# Patient Record
Sex: FEMALE | Race: WHITE | NOT HISPANIC OR LATINO | Employment: FULL TIME | URBAN - METROPOLITAN AREA
[De-identification: names, ages, dates, MRNs, and addresses within clinical notes are randomized per-mention and may not be internally consistent; named-entity substitution may affect disease eponyms.]

---

## 2018-03-11 ENCOUNTER — HOSPITAL ENCOUNTER (EMERGENCY)
Facility: MEDICAL CENTER | Age: 53
End: 2018-03-11
Attending: EMERGENCY MEDICINE
Payer: COMMERCIAL

## 2018-03-11 ENCOUNTER — APPOINTMENT (OUTPATIENT)
Dept: RADIOLOGY | Facility: MEDICAL CENTER | Age: 53
End: 2018-03-11
Attending: EMERGENCY MEDICINE
Payer: COMMERCIAL

## 2018-03-11 VITALS
WEIGHT: 260.14 LBS | BODY MASS INDEX: 36.42 KG/M2 | TEMPERATURE: 98.7 F | OXYGEN SATURATION: 95 % | SYSTOLIC BLOOD PRESSURE: 144 MMHG | HEART RATE: 85 BPM | HEIGHT: 71 IN | RESPIRATION RATE: 18 BRPM | DIASTOLIC BLOOD PRESSURE: 81 MMHG

## 2018-03-11 DIAGNOSIS — J10.1 INFLUENZA B: ICD-10-CM

## 2018-03-11 LAB
ALBUMIN SERPL BCP-MCNC: 4.5 G/DL (ref 3.2–4.9)
ALBUMIN/GLOB SERPL: 1.6 G/DL
ALP SERPL-CCNC: 43 U/L (ref 30–99)
ALT SERPL-CCNC: 20 U/L (ref 2–50)
ANION GAP SERPL CALC-SCNC: 11 MMOL/L (ref 0–11.9)
APPEARANCE UR: CLEAR
AST SERPL-CCNC: 18 U/L (ref 12–45)
BACTERIA #/AREA URNS HPF: ABNORMAL /HPF
BASOPHILS # BLD AUTO: 0.8 % (ref 0–1.8)
BASOPHILS # BLD: 0.03 K/UL (ref 0–0.12)
BILIRUB SERPL-MCNC: 0.4 MG/DL (ref 0.1–1.5)
BILIRUB UR QL STRIP.AUTO: NEGATIVE
BUN SERPL-MCNC: 10 MG/DL (ref 8–22)
CALCIUM SERPL-MCNC: 9.2 MG/DL (ref 8.5–10.5)
CHLORIDE SERPL-SCNC: 101 MMOL/L (ref 96–112)
CO2 SERPL-SCNC: 20 MMOL/L (ref 20–33)
COLOR UR: YELLOW
CREAT SERPL-MCNC: 0.82 MG/DL (ref 0.5–1.4)
EOSINOPHIL # BLD AUTO: 0.01 K/UL (ref 0–0.51)
EOSINOPHIL NFR BLD: 0.3 % (ref 0–6.9)
EPI CELLS #/AREA URNS HPF: ABNORMAL /HPF
ERYTHROCYTE [DISTWIDTH] IN BLOOD BY AUTOMATED COUNT: 42.7 FL (ref 35.9–50)
FLUAV RNA SPEC QL NAA+PROBE: NEGATIVE
FLUBV RNA SPEC QL NAA+PROBE: POSITIVE
GLOBULIN SER CALC-MCNC: 2.9 G/DL (ref 1.9–3.5)
GLUCOSE SERPL-MCNC: 103 MG/DL (ref 65–99)
GLUCOSE UR STRIP.AUTO-MCNC: NEGATIVE MG/DL
HCT VFR BLD AUTO: 45.6 % (ref 37–47)
HGB BLD-MCNC: 14.9 G/DL (ref 12–16)
HYALINE CASTS #/AREA URNS LPF: ABNORMAL /LPF
IMM GRANULOCYTES # BLD AUTO: 0.02 K/UL (ref 0–0.11)
IMM GRANULOCYTES NFR BLD AUTO: 0.5 % (ref 0–0.9)
KETONES UR STRIP.AUTO-MCNC: ABNORMAL MG/DL
LACTATE BLD-SCNC: 1.3 MMOL/L (ref 0.5–2)
LEUKOCYTE ESTERASE UR QL STRIP.AUTO: ABNORMAL
LYMPHOCYTES # BLD AUTO: 0.72 K/UL (ref 1–4.8)
LYMPHOCYTES NFR BLD: 18.8 % (ref 22–41)
MCH RBC QN AUTO: 28.9 PG (ref 27–33)
MCHC RBC AUTO-ENTMCNC: 32.7 G/DL (ref 33.6–35)
MCV RBC AUTO: 88.5 FL (ref 81.4–97.8)
MICRO URNS: ABNORMAL
MONOCYTES # BLD AUTO: 0.61 K/UL (ref 0–0.85)
MONOCYTES NFR BLD AUTO: 15.9 % (ref 0–13.4)
NEUTROPHILS # BLD AUTO: 2.44 K/UL (ref 2–7.15)
NEUTROPHILS NFR BLD: 63.7 % (ref 44–72)
NITRITE UR QL STRIP.AUTO: NEGATIVE
NRBC # BLD AUTO: 0 K/UL
NRBC BLD-RTO: 0 /100 WBC
PH UR STRIP.AUTO: 6 [PH]
PLATELET # BLD AUTO: 274 K/UL (ref 164–446)
PMV BLD AUTO: 9.8 FL (ref 9–12.9)
POTASSIUM SERPL-SCNC: 3.8 MMOL/L (ref 3.6–5.5)
PROT SERPL-MCNC: 7.4 G/DL (ref 6–8.2)
PROT UR QL STRIP: NEGATIVE MG/DL
RBC # BLD AUTO: 5.15 M/UL (ref 4.2–5.4)
RBC # URNS HPF: ABNORMAL /HPF
RBC UR QL AUTO: NEGATIVE
SODIUM SERPL-SCNC: 132 MMOL/L (ref 135–145)
SP GR UR STRIP.AUTO: 1.01
UROBILINOGEN UR STRIP.AUTO-MCNC: 0.2 MG/DL
WBC # BLD AUTO: 3.8 K/UL (ref 4.8–10.8)
WBC #/AREA URNS HPF: ABNORMAL /HPF

## 2018-03-11 PROCEDURE — 87502 INFLUENZA DNA AMP PROBE: CPT

## 2018-03-11 PROCEDURE — 83605 ASSAY OF LACTIC ACID: CPT

## 2018-03-11 PROCEDURE — 80053 COMPREHEN METABOLIC PANEL: CPT

## 2018-03-11 PROCEDURE — 96360 HYDRATION IV INFUSION INIT: CPT

## 2018-03-11 PROCEDURE — A9270 NON-COVERED ITEM OR SERVICE: HCPCS | Performed by: EMERGENCY MEDICINE

## 2018-03-11 PROCEDURE — 87077 CULTURE AEROBIC IDENTIFY: CPT

## 2018-03-11 PROCEDURE — 700102 HCHG RX REV CODE 250 W/ 637 OVERRIDE(OP): Performed by: EMERGENCY MEDICINE

## 2018-03-11 PROCEDURE — 85025 COMPLETE CBC W/AUTO DIFF WBC: CPT

## 2018-03-11 PROCEDURE — 71045 X-RAY EXAM CHEST 1 VIEW: CPT

## 2018-03-11 PROCEDURE — 700105 HCHG RX REV CODE 258: Performed by: EMERGENCY MEDICINE

## 2018-03-11 PROCEDURE — 36415 COLL VENOUS BLD VENIPUNCTURE: CPT

## 2018-03-11 PROCEDURE — 87086 URINE CULTURE/COLONY COUNT: CPT

## 2018-03-11 PROCEDURE — 99284 EMERGENCY DEPT VISIT MOD MDM: CPT

## 2018-03-11 PROCEDURE — 81001 URINALYSIS AUTO W/SCOPE: CPT

## 2018-03-11 PROCEDURE — 87040 BLOOD CULTURE FOR BACTERIA: CPT

## 2018-03-11 PROCEDURE — 87186 SC STD MICRODIL/AGAR DIL: CPT

## 2018-03-11 RX ORDER — OSELTAMIVIR PHOSPHATE 75 MG/1
75 CAPSULE ORAL 2 TIMES DAILY
Qty: 10 CAP | Refills: 0 | Status: SHIPPED | OUTPATIENT
Start: 2018-03-11 | End: 2019-10-13

## 2018-03-11 RX ORDER — OSELTAMIVIR PHOSPHATE 75 MG/1
75 CAPSULE ORAL ONCE
Status: COMPLETED | OUTPATIENT
Start: 2018-03-11 | End: 2018-03-11

## 2018-03-11 RX ORDER — SODIUM CHLORIDE 9 MG/ML
1000 INJECTION, SOLUTION INTRAVENOUS ONCE
Status: COMPLETED | OUTPATIENT
Start: 2018-03-11 | End: 2018-03-11

## 2018-03-11 RX ADMIN — SODIUM CHLORIDE 1000 ML: 9 INJECTION, SOLUTION INTRAVENOUS at 17:42

## 2018-03-11 RX ADMIN — OSELTAMIVIR PHOSPHATE 75 MG: 75 CAPSULE ORAL at 18:40

## 2018-03-11 ASSESSMENT — LIFESTYLE VARIABLES: DO YOU DRINK ALCOHOL: NO

## 2018-03-11 ASSESSMENT — PAIN SCALES - GENERAL: PAINLEVEL_OUTOF10: 6

## 2018-03-11 NOTE — ED TRIAGE NOTES
".  Chief Complaint   Patient presents with   • Flu Like Symptoms     x 2 days, chills, body aches     ./81   Pulse (!) 115   Temp 37.1 °C (98.7 °F) (Temporal)   Resp 17   Ht 1.803 m (5' 11\")   Wt 118 kg (260 lb 2.3 oz)   SpO2 95%   BMI 36.28 kg/m²     Ambulatory to triage with above complaints, educated on triage process, placed in lobby with mask in place, told to inform staff of any changes in condition, sepsis protocol ordered.    "

## 2018-03-12 NOTE — ED PROVIDER NOTES
"ED Provider Note    CHIEF COMPLAINT  Chief Complaint   Patient presents with   • Flu Like Symptoms     x 2 days, chills, body aches       HPI  Meli Navarrete is a 52 y.o. female who presents to the emergency department complaining that she has been ill since Friday with fever chills cough and body aches and generalized discomfort. She did not have a flu shot this year. She does not recognize any exacerbating or alleviating factors or precipitating events    REVIEW OF SYSTEMS no chest pain no hemoptysis no shortness of breath no black or bloody stool or emesis. All other systems negative    PAST MEDICAL HISTORY  Past Medical History:   Diagnosis Date   • Acute chest pain 3/29/2016   • Anxiety 3/8/2010   • Depression 3/8/2010       FAMILY HISTORY  Family History   Problem Relation Age of Onset   • Psychiatry Father      depression   • Heart Disease Mother      atrial fibrillation    • Heart Disease Maternal Grandmother      atrial fibrillation        SOCIAL HISTORY  Social History     Social History   • Marital status: Single     Spouse name: N/A   • Number of children: N/A   • Years of education: N/A     Social History Main Topics   • Smoking status: Never Smoker   • Smokeless tobacco: Never Used   • Alcohol use No   • Drug use: No   • Sexual activity: Yes     Partners: Male     Other Topics Concern   • Not on file     Social History Narrative   • No narrative on file       SURGICAL HISTORY  Past Surgical History:   Procedure Laterality Date   • GYN SURGERY  2001    oopherectomy       CURRENT MEDICATIONS  Home Medications     Reviewed by Chad La R.N. (Registered Nurse) on 03/11/18 at 1717  Med List Status: Complete   Medication Last Dose Status   alprazolam (XANAX) 0.25 MG Tab 12/11/2017 Active                ALLERGIES  No Known Allergies    PHYSICAL EXAM  VITAL SIGNS: /81   Pulse 85   Temp 37.1 °C (98.7 °F) (Temporal)   Resp 18   Ht 1.803 m (5' 11\")   Wt 118 kg (260 lb 2.3 oz)   LMP 02/18/2018   " SpO2 95%   BMI 36.28 kg/m²    Oxygen saturation is interpreted as adequate  Constitutional: Awake and mildly ill-appearing  HENT: Mucous membranes are dry  Eyes: No erythema or discharge or jaundice  Neck: Trachea midline no JVD  Cardiovascular: Regular tachycardia at the time of arrival  Lungs: Clear and equal bilaterally  Abdomen/Back: Obese soft nontender no peritoneal findings  Skin: Warm and dry  Musculoskeletal: No acute bony deformity  Neurologic: Awake verbal moving all extremities    Laboratory  CBC shows white blood cell count of 3.8 hemoglobin is adequate at 14.9 and basic metabolic panels unremarkable of T's unremarkable lactic acid level is normal at 1.3 influenza B test is positive    Radiology  DX-CHEST-PORTABLE (1 VIEW)   Final Result      Negative single view of the chest.            MEDICAL DECISION MAKING and DISPOSITION  In the emergency department an IV was established the patient was given intravenous fluids for clinical findings of dehydration as described above with tachycardia. She was also given oral Tamiflu. These measures have been helpful the heart rate has normalized. I reviewed all the findings with the patient and I feel be safe to continue treatment on an outpatient basis and have written a prescription for Tamiflu. The patient is to rest at home she can use Tylenol and Motrin as needed for discomfort if she feels she is having new or worsening symptoms she may return here for recheck.    IMPRESSION  1. Influenza B         Electronically signed by: Deric Cisneros, 3/11/2018 7:44 PM

## 2018-03-12 NOTE — ED NOTES
Lab called with critical result of flu b positive at 1825. Critical lab result read back to lab.   Dr. Cisneros notified of critical lab result at 1825.  Critical lab result read back by Dr. Cisneros.

## 2018-03-12 NOTE — ED NOTES
Patient discharged with instructions for influenza with prescriptions x0 signs and symptoms explained to patient for reasons to return to emergency department, Patient is understanding and has no further questions.

## 2018-03-12 NOTE — ED TRIAGE NOTES
"Agree with triage note. Pt. Reports fa Pt. Placed on tigue since Friday C/O \"chills and body aches.\"  "

## 2018-03-16 LAB
BACTERIA BLD CULT: NORMAL
BACTERIA BLD CULT: NORMAL
SIGNIFICANT IND 70042: NORMAL
SIGNIFICANT IND 70042: NORMAL
SITE SITE: NORMAL
SITE SITE: NORMAL
SOURCE SOURCE: NORMAL
SOURCE SOURCE: NORMAL

## 2018-03-20 NOTE — ED NOTES
"ED Positive Culture Follow-up/Notification Note:    Date: 3/20/18     Patient seen in the ED on 3/11/2018 for feeling ill with fevers, chills, cough and body aches.   1. Influenza B       Discharge Medication List as of 3/11/2018  6:55 PM      START taking these medications    Details   oseltamivir (TAMIFLU) 75 MG Cap Take 1 Cap by mouth 2 times a day., Disp-10 Cap, R-0, Print Rx Paper             Allergies: Patient has no known allergies.     Vitals:    03/11/18 1652 03/11/18 1730 03/11/18 1800 03/11/18 1833   BP:       Pulse:  91 83 85   Resp:  18 18 18   Temp:       TempSrc:       SpO2:  94% 94% 95%   Weight: 118 kg (260 lb 2.3 oz)      Height:           Final cultures:   Results     Procedure Component Value Units Date/Time    BLOOD CULTURE [[id number]] Collected:  03/11/18 1708    Order Status:  Completed Specimen:  Blood from Peripheral Updated:  03/16/18 1900     Significant Indicator NEG     Source BLD     Site PERIPHERAL     Blood Culture No growth after 5 days of incubation.    Narrative:       Per Hospital Policy: Only change Specimen Src: to \"Line\" if  specified by physician order.    BLOOD CULTURE [[id number]] Collected:  03/11/18 1230    Order Status:  Completed Specimen:  Blood from Peripheral Updated:  03/16/18 1900     Significant Indicator NEG     Source BLD     Site PERIPHERAL     Blood Culture No growth after 5 days of incubation.    Narrative:       Per Hospital Policy: Only change Specimen Src: to \"Line\" if  specified by physician order.    URINE CULTURE(NEW) [[id number]]  (Abnormal) Collected:  03/11/18 1830    Order Status:  Completed Specimen:  Urine Updated:  03/15/18 1639     Significant Indicator POS (POS)     Source UR     Site --     Urine Culture -- (A)      Lactose fermenting Gram negative isrrael  ,000 cfu/mL   (A)    Narrative:       Indication for culture:->Emergency Room Patient          Plan:   Patient does not present with symptoms consistent with urinary tract infection. Culture " result likely represents asymptomatic bacteriuria. No need for antimicrobial treatment at this time.    Kimberly Coe

## 2018-03-26 LAB — TEST NAME 95000: NORMAL

## 2018-03-27 LAB
BACTERIA UR CULT: ABNORMAL
BACTERIA UR CULT: ABNORMAL
SIGNIFICANT IND 70042: ABNORMAL
SITE SITE: ABNORMAL
SOURCE SOURCE: ABNORMAL

## 2018-03-28 NOTE — ED NOTES
ED Positive Culture Follow-up/Notification Note:    Date: 3/28/18     Patient seen in the ED on 3/11/2018 for influenza B infection.   1. Influenza B       Discharge Medication List as of 3/11/2018  6:55 PM      START taking these medications    Details   oseltamivir (TAMIFLU) 75 MG Cap Take 1 Cap by mouth 2 times a day., Disp-10 Cap, R-0, Print Rx Paper             Allergies: Patient has no known allergies.     Vitals:    03/11/18 1652 03/11/18 1730 03/11/18 1800 03/11/18 1833   BP:       Pulse:  91 83 85   Resp:  18 18 18   Temp:       TempSrc:       SpO2:  94% 94% 95%   Weight: 118 kg (260 lb 2.3 oz)      Height:           Final cultures:   Results     Procedure Component Value Units Date/Time    URINE CULTURE(NEW) [[id number]]  (Abnormal) Collected:  03/11/18 1830    Order Status:  Completed Specimen:  Urine Updated:  03/27/18 0957     Significant Indicator POS (POS)     Source UR     Site --     Urine Culture -- (A)      Citrobacter koseri  ,000 cfu/mL  Identification performed at [facility]  Organism failed to grow in susceptibility testing media.   (A)    Narrative:       Previous comment was modified by COLLINS at 12:56 on 03/16/18.  Indication for culture:->Emergency Room Patient  Aerobic Organism Identification with Reflex to Susceptibility [id number]  [facility]. Actively growing gram negatvie isrrael in pure culture. Source: urine.  Ambient.  03/16/2018  12:56          Plan:   Culture results finalized today due to the need to send the medium out to [facility] for ID  -Pt did not present with UTI symptoms. Antimicrobial therapy is not indicated.    Kendal Gama, PharmD, BCPS

## 2019-10-13 ENCOUNTER — OFFICE VISIT (OUTPATIENT)
Dept: URGENT CARE | Facility: CLINIC | Age: 54
End: 2019-10-13
Payer: COMMERCIAL

## 2019-10-13 VITALS
DIASTOLIC BLOOD PRESSURE: 84 MMHG | TEMPERATURE: 98.6 F | WEIGHT: 263 LBS | BODY MASS INDEX: 36.68 KG/M2 | OXYGEN SATURATION: 96 % | SYSTOLIC BLOOD PRESSURE: 122 MMHG | RESPIRATION RATE: 18 BRPM | HEART RATE: 89 BPM

## 2019-10-13 DIAGNOSIS — R05.9 COUGH: ICD-10-CM

## 2019-10-13 DIAGNOSIS — J06.9 VIRAL URI: ICD-10-CM

## 2019-10-13 LAB
FLUAV+FLUBV AG SPEC QL IA: NEGATIVE
INT CON NEG: NORMAL
INT CON POS: NORMAL

## 2019-10-13 PROCEDURE — 87804 INFLUENZA ASSAY W/OPTIC: CPT | Performed by: NURSE PRACTITIONER

## 2019-10-13 PROCEDURE — 99204 OFFICE O/P NEW MOD 45 MIN: CPT | Performed by: NURSE PRACTITIONER

## 2019-10-13 RX ORDER — CODEINE PHOSPHATE AND GUAIFENESIN 10; 100 MG/5ML; MG/5ML
5 SOLUTION ORAL EVERY 6 HOURS PRN
Qty: 140 ML | Refills: 0 | Status: SHIPPED | OUTPATIENT
Start: 2019-10-13 | End: 2019-10-23

## 2020-11-03 ENCOUNTER — OFFICE VISIT (OUTPATIENT)
Dept: MEDICAL GROUP | Facility: MEDICAL CENTER | Age: 55
End: 2020-11-03
Payer: COMMERCIAL

## 2020-11-03 VITALS
HEIGHT: 71 IN | OXYGEN SATURATION: 95 % | HEART RATE: 79 BPM | TEMPERATURE: 99.1 F | RESPIRATION RATE: 16 BRPM | SYSTOLIC BLOOD PRESSURE: 96 MMHG | WEIGHT: 272 LBS | BODY MASS INDEX: 38.08 KG/M2 | DIASTOLIC BLOOD PRESSURE: 68 MMHG

## 2020-11-03 DIAGNOSIS — F33.41 RECURRENT MAJOR DEPRESSIVE DISORDER, IN PARTIAL REMISSION (HCC): ICD-10-CM

## 2020-11-03 DIAGNOSIS — Z12.11 COLON CANCER SCREENING: ICD-10-CM

## 2020-11-03 DIAGNOSIS — I48.0 PAROXYSMAL A-FIB (HCC): ICD-10-CM

## 2020-11-03 DIAGNOSIS — Z23 NEED FOR VACCINATION: ICD-10-CM

## 2020-11-03 DIAGNOSIS — E66.09 CLASS 2 OBESITY DUE TO EXCESS CALORIES WITH BODY MASS INDEX (BMI) OF 37.0 TO 37.9 IN ADULT, UNSPECIFIED WHETHER SERIOUS COMORBIDITY PRESENT: ICD-10-CM

## 2020-11-03 DIAGNOSIS — Z12.31 ENCOUNTER FOR SCREENING MAMMOGRAM FOR MALIGNANT NEOPLASM OF BREAST: ICD-10-CM

## 2020-11-03 PROCEDURE — 90715 TDAP VACCINE 7 YRS/> IM: CPT | Performed by: FAMILY MEDICINE

## 2020-11-03 PROCEDURE — 90471 IMMUNIZATION ADMIN: CPT | Performed by: FAMILY MEDICINE

## 2020-11-03 PROCEDURE — 90472 IMMUNIZATION ADMIN EACH ADD: CPT | Performed by: FAMILY MEDICINE

## 2020-11-03 PROCEDURE — 99204 OFFICE O/P NEW MOD 45 MIN: CPT | Mod: 25 | Performed by: FAMILY MEDICINE

## 2020-11-03 PROCEDURE — 90750 HZV VACC RECOMBINANT IM: CPT | Performed by: FAMILY MEDICINE

## 2020-11-03 ASSESSMENT — PATIENT HEALTH QUESTIONNAIRE - PHQ9
2. FEELING DOWN, DEPRESSED, IRRITABLE, OR HOPELESS: MORE THAN HALF THE DAYS
SUM OF ALL RESPONSES TO PHQ QUESTIONS 1-9: 12
SUM OF ALL RESPONSES TO PHQ9 QUESTIONS 1 AND 2: 4
5. POOR APPETITE OR OVEREATING: SEVERAL DAYS
8. MOVING OR SPEAKING SO SLOWLY THAT OTHER PEOPLE COULD HAVE NOTICED. OR THE OPPOSITE, BEING SO FIGETY OR RESTLESS THAT YOU HAVE BEEN MOVING AROUND A LOT MORE THAN USUAL: NOT AT ALL
3. TROUBLE FALLING OR STAYING ASLEEP OR SLEEPING TOO MUCH: SEVERAL DAYS
4. FEELING TIRED OR HAVING LITTLE ENERGY: SEVERAL DAYS
7. TROUBLE CONCENTRATING ON THINGS, SUCH AS READING THE NEWSPAPER OR WATCHING TELEVISION: SEVERAL DAYS
1. LITTLE INTEREST OR PLEASURE IN DOING THINGS: MORE THAN HALF THE DAYS
6. FEELING BAD ABOUT YOURSELF - OR THAT YOU ARE A FAILURE OR HAVE LET YOURSELF OR YOUR FAMILY DOWN: NEARLY EVERY DAY
9. THOUGHTS THAT YOU WOULD BE BETTER OFF DEAD, OR OF HURTING YOURSELF: SEVERAL DAYS

## 2020-11-03 NOTE — ASSESSMENT & PLAN NOTE
The patient was admitted 3/2016 for chest pain.  Cardiac monitor revealed PAF. The patient denies recent chest pain, palpitations, orthopnea, PND, or lower ext edema. The patient's mother and  Grandmother have atrial fibrillation. Current [id number]-[id number] score 1 for female gender.  Echocardiogram completed 3/2016 unremarkable, EF 65%.

## 2020-11-03 NOTE — PROGRESS NOTES
Subjective:     CC:  Diagnoses of Class 2 obesity due to excess calories with body mass index (BMI) of 37.0 to 37.9 in adult, unspecified whether serious comorbidity present, Recurrent major depressive disorder, in partial remission (HCC), Paroxysmal A-fib (HCC), Need for vaccination, Colon cancer screening, and Encounter for screening mammogram for malignant neoplasm of breast were pertinent to this visit.    HISTORY OF THE PRESENT ILLNESS: Patient is a 55 y.o. female. She is here today to establish care. Patient works as , , has one daughter (attends [employer]). Patient is due for colonoscopy, pap, and mammogram. Shingrix and tdap administered today. Patient is amenable to colonoscopy and mammogram, would like to wait on pap at this time.    Paroxysmal a-fib  The patient was admitted 3/2016 for chest pain.  Cardiac monitor revealed PAF. The patient denies recent chest pain, palpitations, orthopnea, PND, or lower ext edema. The patient's mother and  Grandmother have atrial fibrillation. Current [id number] score 1 for female gender.  Echocardiogram completed 3/2016 unremarkable, EF 65%.    Recurrent major depressive disorder, in partial remission (HCC)  This is a chronic problem.  The patient reports a couple episodes of depression over the last 10 years.  She reports she feels that she is managing her symptoms well with various coping strategies she learned from her previous counselor. She does not feel she needs counseling or medication at this time.        Class 2 obesity due to excess calories with body mass index (BMI) of 37.0 to 37.9 in adult  The patient reports weight has been an issue for many years. She tries to maintain a healthy diet and gets physical activity doing yard work.      Allergies: Patient has no known allergies.    No current [facility]-ordered outpatient medications on file.     No current [facility]-ordered facility-administered medications on file.        Past Medical  "History:   Diagnosis Date   • Acute chest pain 3/29/2016   • Anxiety 3/8/2010   • Depression 3/8/2010       Past Surgical History:   Procedure Laterality Date   • GYN SURGERY  2001    oopherectomy       Social History     Tobacco Use   • Smoking status: Never Smoker   • Smokeless tobacco: Never Used   Substance Use Topics   • Alcohol use: No     Alcohol/week: 0.0 oz   • Drug use: No       Social History     Social History Narrative   • Not on file       Family History   Problem Relation Age of Onset   • Psychiatric Illness Father         depression   • Heart Disease Mother         atrial fibrillation    • Heart Disease Maternal Grandmother         atrial fibrillation        Health Maintenance: See above    ROS:   Gen: no fevers/chills, no changes in weight  Eyes: no changes in vision  ENT: no sore throat or nasal congestion  Pulm: no sob, no cough  CV: no chest pain  GI: no nausea/vomiting, no diarrhea or constipation  : no dysuria  MSk: no myalgias  Skin: no rash  Neuro: no headaches, no numbness/tingling  Immuno: no seasonal allergies  Heme/Lymph: no easy bruising  Psych: se above      Objective:     Exam: BP (!) 96/68 (BP Location: Left arm, Patient Position: Sitting, BP Cuff Size: Adult)   Pulse 79   Temp 37.3 °C (99.1 °F) (Temporal)   Resp 16   Ht 1.803 m (5' 11\")   Wt 123.4 kg (272 lb)   SpO2 95%  Body mass index is 37.94 kg/m².    General: Well-developed, well-nourished. Appears stated age.  Eyes: Normocephalic. Conjunctiva clear without injection or scleral icterus. Pupils equal and reactive to light.   HENT: Normocephalic, atraumatic. Ears normal shape and contour, canals are clear bilaterally, tympanic membranes pearly, oropharynx is clear without erythema, edema or exudates.   Neck: Supple; no obvious thyromegaly or nodules appreciated  Pulmonary: Clear to ausculation bilaterally.  No increased work of breathing. No rales, ronchi, or wheezing.  Cardiovascular: Regular rate and rhythm without " murmur.  Abdomen: Soft, nontender, nondistended. Normal bowel sounds. No guarding or rebound tenderness.  Neurologic: CN III-XII intact.  Lymph: No cervical or supraclavicular lymphadenopathy palpated.  Skin: Warm and dry.  No obvious lesions.  Musculoskeletal: Normal gait. No extremity cyanosis, clubbing, or edema.  Psych: Euthymic affect. Alert and oriented x 3. Judgment and insight is normal.      Assessment & Plan:   55 y.o. female with the following -    1. Class 2 obesity due to excess calories with body mass index (BMI) of 37.0 to 37.9 in adult, unspecified whether serious comorbidity present  - Discussed recommendation for diet rich in vegetables, fruits, fiber, minimal processed/packaged foods, and 150 minutes of moderate exercise per week.  - discuss weight loss medications at next visit pending labs  - Comp Metabolic Panel; Future  - HEMOGLOBIN A1C; Future  - Lipid Profile; Future  - CBC WITH DIFFERENTIAL; Future  - TSH WITH REFLEX TO FT4; Future    2. Recurrent major depressive disorder, in partial remission (HCC)  - Discussed referral for counseling and/or medication, patient declines at this time  - Discussed the importance of regular exercise, healthy diet, good sleep hygiene, and positive social interaction    3. Paroxysmal A-fib (HCC)  - Discussed cardiac monitoring, patient declines at this time as she reports she is asymptomatic  - Discussed possible anticoagulation in the future, [id number] score currently 1, no documentation of afib other than isolated occurrence in hospital 3/2016    4. Need for vaccination  - Shingles Vaccine (Shingrix)  - TDAP VACCINE =>8YO IM    5. Colon cancer screening  - REFERRAL TO GI FOR COLONOSCOPY    6. Encounter for screening mammogram for malignant neoplasm of breast  - MA-SCREENING MAMMO BILAT W/TOMOSYNTHESIS W/CAD; Future    Return in about 1 month (around 12/3/2020) for F/U lab.    Please note this dictation was created using voice recognition software. I have  made every reasonable attempt to correct obvious errors, but I expect there may be errors of grammar, and possibly content, that I did not discover before finalizing the note.

## 2020-11-03 NOTE — ASSESSMENT & PLAN NOTE
The patient reports weight has been an issue for many years. She tries to maintain a healthy diet and gets physical activity doing yard work.

## 2020-11-03 NOTE — ASSESSMENT & PLAN NOTE
This is a chronic problem.  The patient reports a couple episodes of depression over the last 10 years.  She reports she feels that she is managing her symptoms well with various coping strategies she learned from her previous counselor. She does not feel she needs counseling or medication at this time.

## 2020-11-09 ENCOUNTER — HOSPITAL ENCOUNTER (OUTPATIENT)
Dept: LAB | Facility: MEDICAL CENTER | Age: 55
End: 2020-11-09
Attending: FAMILY MEDICINE
Payer: COMMERCIAL

## 2020-11-09 DIAGNOSIS — E66.09 CLASS 2 OBESITY DUE TO EXCESS CALORIES WITH BODY MASS INDEX (BMI) OF 37.0 TO 37.9 IN ADULT, UNSPECIFIED WHETHER SERIOUS COMORBIDITY PRESENT: ICD-10-CM

## 2020-11-09 LAB
ALBUMIN SERPL BCP-MCNC: 3.8 G/DL (ref 3.2–4.9)
ALBUMIN/GLOB SERPL: 1.3 G/DL
ALP SERPL-CCNC: 46 U/L (ref 30–99)
ALT SERPL-CCNC: 16 U/L (ref 2–50)
ANION GAP SERPL CALC-SCNC: 9 MMOL/L (ref 7–16)
AST SERPL-CCNC: 16 U/L (ref 12–45)
BASOPHILS # BLD AUTO: 0.8 % (ref 0–1.8)
BASOPHILS # BLD: 0.05 K/UL (ref 0–0.12)
BILIRUB SERPL-MCNC: 0.3 MG/DL (ref 0.1–1.5)
BUN SERPL-MCNC: 15 MG/DL (ref 8–22)
CALCIUM SERPL-MCNC: 9.2 MG/DL (ref 8.5–10.5)
CHLORIDE SERPL-SCNC: 104 MMOL/L (ref 96–112)
CHOLEST SERPL-MCNC: 194 MG/DL (ref 100–199)
CO2 SERPL-SCNC: 23 MMOL/L (ref 20–33)
CREAT SERPL-MCNC: 0.67 MG/DL (ref 0.5–1.4)
EOSINOPHIL # BLD AUTO: 0.16 K/UL (ref 0–0.51)
EOSINOPHIL NFR BLD: 2.5 % (ref 0–6.9)
ERYTHROCYTE [DISTWIDTH] IN BLOOD BY AUTOMATED COUNT: 42.5 FL (ref 35.9–50)
EST. AVERAGE GLUCOSE BLD GHB EST-MCNC: 111 MG/DL
FASTING STATUS PATIENT QL REPORTED: NORMAL
GLOBULIN SER CALC-MCNC: 2.9 G/DL (ref 1.9–3.5)
GLUCOSE SERPL-MCNC: 103 MG/DL (ref 65–99)
HBA1C MFR BLD: 5.5 % (ref 0–5.6)
HCT VFR BLD AUTO: 43.9 % (ref 37–47)
HDLC SERPL-MCNC: 70 MG/DL
HGB BLD-MCNC: 14.3 G/DL (ref 12–16)
IMM GRANULOCYTES # BLD AUTO: 0.03 K/UL (ref 0–0.11)
IMM GRANULOCYTES NFR BLD AUTO: 0.5 % (ref 0–0.9)
LDLC SERPL CALC-MCNC: 103 MG/DL
LYMPHOCYTES # BLD AUTO: 1.97 K/UL (ref 1–4.8)
LYMPHOCYTES NFR BLD: 30.2 % (ref 22–41)
MCH RBC QN AUTO: 28.9 PG (ref 27–33)
MCHC RBC AUTO-ENTMCNC: 32.6 G/DL (ref 33.6–35)
MCV RBC AUTO: 88.9 FL (ref 81.4–97.8)
MONOCYTES # BLD AUTO: 0.43 K/UL (ref 0–0.85)
MONOCYTES NFR BLD AUTO: 6.6 % (ref 0–13.4)
NEUTROPHILS # BLD AUTO: 3.88 K/UL (ref 2–7.15)
NEUTROPHILS NFR BLD: 59.4 % (ref 44–72)
NRBC # BLD AUTO: 0 K/UL
NRBC BLD-RTO: 0 /100 WBC
PLATELET # BLD AUTO: 304 K/UL (ref 164–446)
PMV BLD AUTO: 10.3 FL (ref 9–12.9)
POTASSIUM SERPL-SCNC: 4.2 MMOL/L (ref 3.6–5.5)
PROT SERPL-MCNC: 6.7 G/DL (ref 6–8.2)
RBC # BLD AUTO: 4.94 M/UL (ref 4.2–5.4)
SODIUM SERPL-SCNC: 136 MMOL/L (ref 135–145)
TRIGL SERPL-MCNC: 107 MG/DL (ref 0–149)
TSH SERPL DL<=0.005 MIU/L-ACNC: 1.26 UIU/ML (ref 0.38–5.33)
WBC # BLD AUTO: 6.5 K/UL (ref 4.8–10.8)

## 2020-11-09 PROCEDURE — 84443 ASSAY THYROID STIM HORMONE: CPT

## 2020-11-09 PROCEDURE — 83036 HEMOGLOBIN GLYCOSYLATED A1C: CPT

## 2020-11-09 PROCEDURE — 36415 COLL VENOUS BLD VENIPUNCTURE: CPT

## 2020-11-09 PROCEDURE — 80053 COMPREHEN METABOLIC PANEL: CPT

## 2020-11-09 PROCEDURE — 85025 COMPLETE CBC W/AUTO DIFF WBC: CPT

## 2020-11-09 PROCEDURE — 80061 LIPID PANEL: CPT

## 2020-12-15 ENCOUNTER — APPOINTMENT (OUTPATIENT)
Dept: MEDICAL GROUP | Facility: MEDICAL CENTER | Age: 55
End: 2020-12-15
Payer: COMMERCIAL

## 2021-01-19 ENCOUNTER — OFFICE VISIT (OUTPATIENT)
Dept: MEDICAL GROUP | Facility: MEDICAL CENTER | Age: 56
End: 2021-01-19
Payer: COMMERCIAL

## 2021-01-19 VITALS
WEIGHT: 277.56 LBS | HEART RATE: 84 BPM | DIASTOLIC BLOOD PRESSURE: 72 MMHG | SYSTOLIC BLOOD PRESSURE: 124 MMHG | OXYGEN SATURATION: 95 % | BODY MASS INDEX: 38.86 KG/M2 | TEMPERATURE: 98.8 F | HEIGHT: 71 IN | RESPIRATION RATE: 16 BRPM

## 2021-01-19 DIAGNOSIS — I48.0 PAROXYSMAL A-FIB (HCC): ICD-10-CM

## 2021-01-19 DIAGNOSIS — R42 LIGHTHEADEDNESS: ICD-10-CM

## 2021-01-19 DIAGNOSIS — Z23 NEED FOR VACCINATION: ICD-10-CM

## 2021-01-19 DIAGNOSIS — R51.9 ACUTE NONINTRACTABLE HEADACHE, UNSPECIFIED HEADACHE TYPE: ICD-10-CM

## 2021-01-19 PROCEDURE — 90750 HZV VACC RECOMBINANT IM: CPT | Performed by: FAMILY MEDICINE

## 2021-01-19 PROCEDURE — 90471 IMMUNIZATION ADMIN: CPT | Performed by: FAMILY MEDICINE

## 2021-01-19 PROCEDURE — 99214 OFFICE O/P EST MOD 30 MIN: CPT | Mod: 25 | Performed by: FAMILY MEDICINE

## 2021-01-19 ASSESSMENT — FIBROSIS 4 INDEX: FIB4 SCORE: 0.71

## 2021-01-19 NOTE — ASSESSMENT & PLAN NOTE
The patient was admitted 3/2016 for chest pain.  Cardiac monitor revealed PAF. The patient denies recent chest pain, palpitations, orthopnea, PND, or lower ext edema. The patient's mother and  Grandmother have atrial fibrillation. Current [id number]-DAYl score 1 for female gender.  Echocardiogram completed 3/2016 unremarkable, EF 65%.

## 2021-01-19 NOTE — PROGRESS NOTES
"Subjective:     CC: lightheadedness     HPI:   Meli presents today with:    Lightheadedness  The patient reports she has experienced a \"very weird\" sensation where she feels lightheadedness, changes in vision, and feels like she is \"out of body.\" The episodes right spontaneously, lasted a few seconds, and have resolved spontaneously.     The first episode occurred five days ago while she was driving and stopped at a stop sign. The second episode occurred later that evening while she was working at her desk. A third episode occurred yesterday while she was sitting on the couch. She reports an abnormal taste in her mouth after the third episode. She also experienced a pressure sensation in her head.  She stood up to get a glass of water and felt her heart racing. She denies bowel or bladder incontinence, involuntary movement, focal numbness, weakness, dysarthria, confusion. She denies chest pain or shortness of breath. She denies preceding anxiety or recent history of panic attacks (she had a panic attack 18 yo).     EMTs evaluated her after the most recent episode; she reports her glucose was 114, her BP was 190/60. ECG she brought with her today shows sinus rhythm HR 89 no ST changes.    Her paternal uncle had a brain tumor when he was 53yo which he succumbed to 6 weeks after diagnosis. The patient is concerned she may have a brain tumor. She has head more frequent headaches over the past month which resolve with tylenol.    The patient has history PAF, she does report occasional transient palpitations; the patient's mother and grandmother both had atrial fibrillation.     Paroxysmal a-fib  The patient was admitted 3/2016 for chest pain.  Cardiac monitor revealed PAF. The patient denies recent chest pain, orthopnea, PND, or lower ext edema. The patient's mother and  Grandmother have atrial fibrillation. Current [id number] score 1 for female gender.  Echocardiogram completed 3/2016 unremarkable, EF 65%.      Past " "Medical History:   Diagnosis Date   • Acute chest pain 3/29/2016   • Anxiety 3/8/2010   • Depression 3/8/2010       Social History     Tobacco Use   • Smoking status: Never Smoker   • Smokeless tobacco: Never Used   Substance Use Topics   • Alcohol use: No     Alcohol/week: 0.0 oz   • Drug use: No       No current Epic-ordered outpatient medications on file.     No current Epic-ordered facility-administered medications on file.        Allergies:  Patient has no known allergies.    Health Maintenance:     ROS:  Gen: no fevers/chills, no changes in weight  Eyes: no changes in vision  ENT: no sore throat, no hearing loss, no bloody nose  Pulm: no SOB  CV: no chest pain        Objective:       Exam:  /72 (BP Location: Left arm, Patient Position: Sitting, BP Cuff Size: Adult long)   Pulse 84   Temp 37.1 °C (98.8 °F) (Temporal)   Resp 16   Ht 1.803 m (5' 11\")   Wt (!) 125.9 kg (277 lb 9 oz)   SpO2 95%   BMI 38.71 kg/m²  Body mass index is 38.71 kg/m².    Gen: Alert and oriented, No apparent distress  Lungs: Normal effort, CTA bilaterally, no wheezes, rhonchi, or rales  CV: Regular rate and rhythm, no murmurs, rubs, or gallops  Neuro: CN II-XII intact, strength 5/5 and symmetric in biceps, triceps, quadricepts, hamstrings; 5/5  strength bilaterally; sensation intact bilaterally to light touch, patellar reflexes 2+ bilaterally, no pronator drift, no dysdiadochokinesia, normal gait.      Assessment & Plan:     55 y.o. female with the following -     1. Lightheadedness  This is a new, unstable problem. Etiology unclear at this time. Patient does have h/o PAF, will proceed with zio patch. Discussed intracranial mass is unlikely given lack of focal neurological deficits other than nonspecific vision changes during episodes. Discussed possibility of partial seizure, will proceed with workup per below prior to ordering EEG. Possible atypical migraine however no history of migraines.  Some symptoms consistent with " panic attack although patient denies anxiety. We will proceed with workup and review results in 2-3 weeks. In the meantime ED precautions discussed.   - Comp Metabolic Panel; Future  - HEMOGLOBIN A1C; Future  - Lipid Profile; Future  - CBC WITH DIFFERENTIAL; Future  - TSH WITH REFLEX TO FT4; Future  - EC-ECHOCARDIOGRAM COMPLETE W/O CONT; Future  - Cardiac Event Monitor; Future  - MR-BRAIN-WITH & W/O; Future    2. Acute nonintractable headache, unspecified headache type  - MR-BRAIN-WITH & W/O; Future    3. Paroxysmal A-fib (HCC)    4. Need for vaccination  - Shingles Vaccine (Shingrix)      Return in about 2 weeks (around 2/2/2021).    Please note this dictation was created using voice recognition software. I have made every reasonable attempt to correct obvious errors, but I expect there may be errors of grammar, and possibly content, that I did not discover before finalizing the note.

## 2021-01-19 NOTE — ASSESSMENT & PLAN NOTE
"The patient reports she has experienced a \"very weird\" sensation where she feels lightheadedness, changes in vision, and feels like she is \"out of body.\" The episodes right spontaneously, lasted a few seconds, and have resolved spontaneously.     The first episode occurred five days ago while she was driving and stopped at a stop sign. The second episode occurred later that evening while she was working at her desk. A third episode occurred yesterday while she was sitting on the couch. She reports an abnormal taste in her mouth after the third episode. She also experienced a pressure sensation in her head.  She stood up to get a glass of water and felt her heart racing. She denies bowel or bladder incontinence, involuntary movement, focal numbness, weakness, dysarthria, confusion. She denies chest pain or shortness of breath. She denies preceding anxiety or recent history of panic attacks (she had a panic attack 20 yo).     EMTs evaluated her after the most recent episode; she reports her glucose was 114, her BP was 190/60. ECG she brought with her today shows sinus rhythm HR 89 no ST changes.    Her paternal uncle had a brain tumor when he was 53yo which he succumbed to 6 weeks after diagnosis. The patient is concerned she may have a brain tumor. She has head more frequent headaches over the past month which resolve with tylenol.    The patient has history PAF, she does report occasional transient palpitations; the patient's mother and grandmother both had atrial fibrillation.   "

## 2021-02-11 ENCOUNTER — APPOINTMENT (OUTPATIENT)
Dept: RADIOLOGY | Facility: MEDICAL CENTER | Age: 56
End: 2021-02-11
Attending: FAMILY MEDICINE
Payer: COMMERCIAL

## 2021-02-11 ENCOUNTER — HOSPITAL ENCOUNTER (OUTPATIENT)
Dept: CARDIOLOGY | Facility: MEDICAL CENTER | Age: 56
End: 2021-02-11
Attending: FAMILY MEDICINE
Payer: COMMERCIAL

## 2021-02-11 DIAGNOSIS — R42 LIGHTHEADEDNESS: ICD-10-CM

## 2021-02-11 LAB
LV EJECT FRACT  99904: 60
LV EJECT FRACT MOD 2C 99903: 56.64
LV EJECT FRACT MOD 4C 99902: 69.73
LV EJECT FRACT MOD BP 99901: 64.96

## 2021-02-11 PROCEDURE — 93306 TTE W/DOPPLER COMPLETE: CPT | Mod: 26 | Performed by: INTERNAL MEDICINE

## 2021-02-11 PROCEDURE — 93306 TTE W/DOPPLER COMPLETE: CPT

## 2021-02-17 ENCOUNTER — TELEMEDICINE (OUTPATIENT)
Dept: MEDICAL GROUP | Facility: MEDICAL CENTER | Age: 56
End: 2021-02-17
Payer: COMMERCIAL

## 2021-02-17 VITALS — WEIGHT: 277 LBS | HEIGHT: 71 IN | BODY MASS INDEX: 38.78 KG/M2

## 2021-02-17 DIAGNOSIS — R42 LIGHTHEADEDNESS: ICD-10-CM

## 2021-02-17 PROCEDURE — 99213 OFFICE O/P EST LOW 20 MIN: CPT | Mod: 95,CR | Performed by: PHYSICIAN ASSISTANT

## 2021-02-17 ASSESSMENT — FIBROSIS 4 INDEX: FIB4 SCORE: 0.71

## 2021-02-18 NOTE — PROGRESS NOTES
"This visit was conducted utilizing secure and encrypted videoconferencing equipment, with the patient's consent.    Patient's identity was verified.          Chief Complaint   Patient presents with   • Results     echo        HPI:     Meli Navarrete is a 55 y.o. female. Patient is presenting to discuss: ECHO results  Patient has been having lightheadedness and some other specific symptoms, please refer to Dr. Kramer's previous note.  Echo was reviewed by me today, without significant changes from echo from 2016.  Ejection fraction has decreased from 65--> 60.   Patient has not done blood work because last time it was done about 4 months ago, patient had to play $240 out-of-pocket which was a lot of money out of pocket.  So she has been hesitant to do blood work at this time.  Symptoms has a slightly improved.  She has had 2 further episodes of feeling lightheaded, however states she had no symptoms when she was shoveling snow which is a strenuous activity.  No S OB or CP.        Past medical, surgical, family, and social history is reviewed in Epic chart by me today.   Medications and allergies reviewed and updated in Epic chart by me today.          Objective:     Ht 1.803 m (5' 11\")   Wt (!) 126 kg (277 lb)  Body mass index is 38.63 kg/m².   Physical Exam:  Pain: sounds comfortable   Constitutional: Alert, no distress.  Eye: pupils Equal, conjunctiva clear,   Respiratory: Unlabored respiratory effort, speaking in full sentences, no audible wheezes.    Echo results reviewed by me today and discussed with patient         Assessment and Plan:   The following treatment plan was discussed      1. Lightheadedness  Patient hesitant to do full panel ordered for her.  I will discussed with Dr. Kramer to see if it is okay just to do the following labs.  - Basic Metabolic Panel; Future  - CBC WITH DIFFERENTIAL; Future  - TSH WITH REFLEX TO FT4; Future        F/U  w pcp                "

## 2021-11-07 ENCOUNTER — OFFICE VISIT (OUTPATIENT)
Dept: URGENT CARE | Facility: CLINIC | Age: 56
End: 2021-11-07
Payer: COMMERCIAL

## 2021-11-07 VITALS
RESPIRATION RATE: 16 BRPM | TEMPERATURE: 99.5 F | WEIGHT: 250 LBS | OXYGEN SATURATION: 97 % | BODY MASS INDEX: 35 KG/M2 | HEIGHT: 71 IN | SYSTOLIC BLOOD PRESSURE: 116 MMHG | HEART RATE: 98 BPM | DIASTOLIC BLOOD PRESSURE: 70 MMHG

## 2021-11-07 DIAGNOSIS — J02.0 STREP PHARYNGITIS: ICD-10-CM

## 2021-11-07 DIAGNOSIS — J02.9 SORE THROAT: ICD-10-CM

## 2021-11-07 LAB
INT CON NEG: NORMAL
INT CON POS: NORMAL
S PYO AG THROAT QL: POSITIVE

## 2021-11-07 PROCEDURE — 87880 STREP A ASSAY W/OPTIC: CPT | Performed by: NURSE PRACTITIONER

## 2021-11-07 PROCEDURE — 99213 OFFICE O/P EST LOW 20 MIN: CPT | Performed by: NURSE PRACTITIONER

## 2021-11-07 RX ORDER — PENICILLIN V POTASSIUM 500 MG/1
500 TABLET ORAL 3 TIMES DAILY
Qty: 30 TABLET | Refills: 0 | Status: SHIPPED | OUTPATIENT
Start: 2021-11-07 | End: 2021-11-17

## 2021-11-07 ASSESSMENT — ENCOUNTER SYMPTOMS
COUGH: 1
SORE THROAT: 1

## 2021-11-07 ASSESSMENT — FIBROSIS 4 INDEX: FIB4 SCORE: .7236842105263157895

## 2021-11-07 NOTE — PROGRESS NOTES
Subjective     Meil Navarrete is a 56 y.o. female who presents with Laryngitis (2X WEEKS, SWOLLEN GLANDS, DRY COUGH )            Pharyngitis   This is a new problem. Episode onset: pt reports ST, loss of voice and fatigue for 2 weeks. Mild cough at night. No fevers.  The problem has been unchanged. Neither side of throat is experiencing more pain than the other. Associated symptoms include coughing. She has tried acetaminophen, gargles and cool liquids (vitamin C) for the symptoms. The treatment provided no relief.       Review of Systems   HENT: Positive for sore throat.    Respiratory: Positive for cough.    All other systems reviewed and are negative.           Past Medical History:   Diagnosis Date   • Acute chest pain 3/29/2016   • Anxiety 3/8/2010   • Depression 3/8/2010      Past Surgical History:   Procedure Laterality Date   • GYN SURGERY  2001    oopherectomy      Social History     Socioeconomic History   • Marital status: Single     Spouse name: Not on file   • Number of children: Not on file   • Years of education: Not on file   • Highest education level: Not on file   Occupational History   • Not on file   Tobacco Use   • Smoking status: Never Smoker   • Smokeless tobacco: Never Used   Vaping Use   • Vaping Use: Never used   Substance and Sexual Activity   • Alcohol use: No     Alcohol/week: 0.0 oz   • Drug use: No   • Sexual activity: Yes     Partners: Male   Other Topics Concern   • Not on file   Social History Narrative   • Not on file     Social Determinants of Health     Financial Resource Strain:    • Difficulty of Paying Living Expenses: Not on file   Food Insecurity:    • Worried About Running Out of Food in the Last Year: Not on file   • Ran Out of Food in the Last Year: Not on file   Transportation Needs:    • Lack of Transportation (Medical): Not on file   • Lack of Transportation (Non-Medical): Not on file   Physical Activity:    • Days of Exercise per Week: Not on file   • Minutes of  "Exercise per Session: Not on file   Stress:    • Feeling of Stress : Not on file   Social Connections:    • Frequency of Communication with Friends and Family: Not on file   • Frequency of Social Gatherings with Friends and Family: Not on file   • Attends Hinduism Services: Not on file   • Active Member of Clubs or Organizations: Not on file   • Attends Club or Organization Meetings: Not on file   • Marital Status: Not on file   Intimate Partner Violence:    • Fear of Current or Ex-Partner: Not on file   • Emotionally Abused: Not on file   • Physically Abused: Not on file   • Sexually Abused: Not on file   Housing Stability:    • Unable to Pay for Housing in the Last Year: Not on file   • Number of Places Lived in the Last Year: Not on file   • Unstable Housing in the Last Year: Not on file       Objective     /70 (BP Location: Left arm, Patient Position: Sitting, BP Cuff Size: Adult)   Pulse 98   Temp 37.5 °C (99.5 °F) (Temporal)   Resp 16   Ht 1.803 m (5' 11\")   Wt 113 kg (250 lb)   SpO2 97%   BMI 34.87 kg/m²      Physical Exam  Vitals and nursing note reviewed.   Constitutional:       Appearance: Normal appearance. She is normal weight.   HENT:      Head: Normocephalic and atraumatic.      Nose: Nose normal.      Mouth/Throat:      Mouth: Mucous membranes are moist.      Pharynx: Oropharynx is clear. Posterior oropharyngeal erythema present. No oropharyngeal exudate.      Comments: Hoarse voice  Eyes:      Extraocular Movements: Extraocular movements intact.      Pupils: Pupils are equal, round, and reactive to light.   Cardiovascular:      Rate and Rhythm: Normal rate and regular rhythm.   Pulmonary:      Effort: Pulmonary effort is normal.      Breath sounds: Normal breath sounds.   Musculoskeletal:         General: Normal range of motion.      Cervical back: Normal range of motion.   Skin:     General: Skin is warm and dry.      Capillary Refill: Capillary refill takes less than 2 seconds. "   Neurological:      General: No focal deficit present.      Mental Status: She is alert and oriented to person, place, and time. Mental status is at baseline.   Psychiatric:         Mood and Affect: Mood normal.         Speech: Speech normal.         Thought Content: Thought content normal.         Judgment: Judgment normal.                             Assessment & Plan        1. Sore throat  - POCT Rapid Strep A POSITIVE    2. Strep pharyngitis  - penicillin v potassium (VEETID) 500 MG Tab; Take 1 Tablet by mouth 3 times a day for 10 days.  Dispense: 30 Tablet; Refill: 0    Take full course of abx  Warm salt water gargles  Alternate tylenol and ibuprofen for pain  Soft foods and cool liquids  Throat lozenges as directed  Supportive care, differential diagnoses, and indications for immediate follow-up discussed with patient.    Pathogenesis of diagnosis discussed including typical length and natural progression.      Instructed to return to  or nearest emergency department if symptoms fail to improve, for any change in condition, further concerns, or new concerning symptoms.  Patient states understanding of the plan of care and discharge instructions.

## 2022-04-15 ENCOUNTER — APPOINTMENT (RX ONLY)
Dept: URBAN - METROPOLITAN AREA CLINIC 4 | Facility: CLINIC | Age: 57
Setting detail: DERMATOLOGY
End: 2022-04-15

## 2022-04-15 DIAGNOSIS — L81.4 OTHER MELANIN HYPERPIGMENTATION: ICD-10-CM

## 2022-04-15 DIAGNOSIS — L82.1 OTHER SEBORRHEIC KERATOSIS: ICD-10-CM

## 2022-04-15 DIAGNOSIS — B07.8 OTHER VIRAL WARTS: ICD-10-CM

## 2022-04-15 PROBLEM — D48.5 NEOPLASM OF UNCERTAIN BEHAVIOR OF SKIN: Status: ACTIVE | Noted: 2022-04-15

## 2022-04-15 PROCEDURE — 11102 TANGNTL BX SKIN SINGLE LES: CPT

## 2022-04-15 PROCEDURE — ? LIQUID NITROGEN

## 2022-04-15 PROCEDURE — 99202 OFFICE O/P NEW SF 15 MIN: CPT | Mod: 25

## 2022-04-15 PROCEDURE — ? BIOPSY BY SHAVE METHOD

## 2022-04-15 PROCEDURE — ? COUNSELING

## 2022-04-15 PROCEDURE — 11103 TANGNTL BX SKIN EA SEP/ADDL: CPT

## 2022-04-15 ASSESSMENT — LOCATION DETAILED DESCRIPTION DERM
LOCATION DETAILED: LEFT INFERIOR FOREHEAD
LOCATION DETAILED: LEFT INFERIOR MEDIAL FOREHEAD
LOCATION DETAILED: RIGHT DORSAL SMALL FINGER METACARPOPHALANGEAL JOINT
LOCATION DETAILED: SUPERIOR MID FOREHEAD
LOCATION DETAILED: HAIR

## 2022-04-15 ASSESSMENT — LOCATION SIMPLE DESCRIPTION DERM
LOCATION SIMPLE: HAIR
LOCATION SIMPLE: SUPERIOR FOREHEAD
LOCATION SIMPLE: LEFT FOREHEAD
LOCATION SIMPLE: RIGHT HAND

## 2022-04-15 ASSESSMENT — LOCATION ZONE DERM
LOCATION ZONE: HAND
LOCATION ZONE: FACE
LOCATION ZONE: SCALP

## 2022-04-15 NOTE — HPI: WART (PATIENT REPORTED)
Where Is Your Wart Located?: Right [facility]
List Over The Counter Wart Treatments You Are Currently Using (Separate Each Name With A Comma):: Compound W

## 2022-04-15 NOTE — PROCEDURE: BIOPSY BY SHAVE METHOD
Detail Level: Detailed
Depth Of Biopsy: dermis
Was A Bandage Applied: Yes
Size Of Lesion In Cm: 1
X Size Of Lesion In Cm: 0
Biopsy Type: H and E
Biopsy Method: Personna blade
Anesthesia Type: 1% lidocaine with epinephrine
Hemostasis: Electrocautery
Wound Care: Vaseline
Dressing: Band-Aid
Destruction After The Procedure: No
Type Of Destruction Used: Electrodesiccation
Curettage Text: The wound bed was treated with curettage after the biopsy was performed.
Cryotherapy Text: The wound bed was treated with cryotherapy after the biopsy was performed.
Electrodesiccation Text: The wound bed was treated with electrodesiccation after the biopsy was performed.
Electrodesiccation And Curettage Text: The wound bed was treated with electrodesiccation and curettage after the biopsy was performed.
Silver Nitrate Text: The wound bed was treated with silver nitrate after the biopsy was performed.
Lab: [id number]
Lab Facility: 
Consent: Verbal consent was obtained and risks were reviewed including but not limited to scarring, infection, bleeding, scabbing, incomplete removal, nerve damage and allergy to anesthesia.
Post-Care Instructions: I reviewed with the patient in detail post-care instructions. Patient is to keep the biopsy site dry overnight, and then apply bacitracin/petroleum  twice daily until healed. Patient may apply hydrogen peroxide soaks to remove any crusting.
Notification Instructions: Patient will be notified of biopsy results. However, patient instructed to call the office if not contacted within 2 weeks.
Billing Type: Third-Party Bill
Information: Selecting Yes will display possible errors in your note based on the variables you have selected. This validation is only offered as a suggestion for you. PLEASE NOTE THAT THE VALIDATION TEXT WILL BE REMOVED WHEN YOU FINALIZE YOUR NOTE. IF YOU WANT TO FAX A PRELIMINARY NOTE YOU WILL NEED TO TOGGLE THIS TO 'NO' IF YOU DO NOT WANT IT IN YOUR FAXED NOTE.
Size Of Lesion In Cm: 0.4

## 2022-04-15 NOTE — PROCEDURE: LIQUID NITROGEN
Render Note In Bullet Format When Appropriate: No
Show Spray Paint Technique Variable?: Yes
Spray Paint Text: The liquid nitrogen was applied to the skin utilizing a spray paint frosting technique.
Post-Care Instructions: I reviewed with the patient in detail post-care instructions. Patient is to wear sunprotection, and avoid picking at any of the treated lesions. Pt may apply Vaseline to crusted or scabbing areas.
Detail Level: Detailed
Consent: The patient's consent was obtained including but not limited to risks of crusting, scabbing, blistering, scarring, darker or lighter pigmentary change, recurrence, incomplete removal and infection.
Medical Necessity Information: It is in your best interest to select a reason for this procedure from the list below. All of these items fulfill various CMS LCD requirements except the new and changing color options.
Medical Necessity Clause: This procedure was medically necessary because the lesions that were treated were:  If lesion does not resolve, bx is needed.
Duration Of Freeze Thaw-Cycle (Seconds): 0

## 2022-04-22 ENCOUNTER — RX ONLY (OUTPATIENT)
Age: 57
Setting detail: RX ONLY
End: 2022-04-22

## 2022-04-22 RX ORDER — IMIQUIMOD 12.5 MG/.25G
CREAM TOPICAL
Qty: 24 | Refills: 1 | Status: ERX | COMMUNITY
Start: 2022-04-22

## 2022-07-05 ENCOUNTER — OFFICE VISIT (OUTPATIENT)
Dept: URGENT CARE | Facility: CLINIC | Age: 57
End: 2022-07-05
Payer: COMMERCIAL

## 2022-07-05 VITALS
DIASTOLIC BLOOD PRESSURE: 76 MMHG | HEIGHT: 71 IN | HEART RATE: 63 BPM | TEMPERATURE: 98.3 F | BODY MASS INDEX: 37.35 KG/M2 | SYSTOLIC BLOOD PRESSURE: 122 MMHG | OXYGEN SATURATION: 96 % | RESPIRATION RATE: 16 BRPM | WEIGHT: 266.8 LBS

## 2022-07-05 DIAGNOSIS — B34.9 VIRAL ILLNESS: ICD-10-CM

## 2022-07-05 DIAGNOSIS — R05.9 COUGH: ICD-10-CM

## 2022-07-05 LAB
EXTERNAL QUALITY CONTROL: NORMAL
SARS-COV+SARS-COV-2 AG RESP QL IA.RAPID: NEGATIVE

## 2022-07-05 PROCEDURE — 99214 OFFICE O/P EST MOD 30 MIN: CPT | Performed by: NURSE PRACTITIONER

## 2022-07-05 PROCEDURE — 87426 SARSCOV CORONAVIRUS AG IA: CPT | Performed by: NURSE PRACTITIONER

## 2022-07-05 RX ORDER — IMIQUIMOD 12.5 MG/.25G
CREAM TOPICAL
COMMUNITY
Start: 2022-04-22

## 2022-07-05 RX ORDER — BENZONATATE 100 MG/1
100 CAPSULE ORAL 3 TIMES DAILY PRN
Qty: 60 CAPSULE | Refills: 0 | Status: SHIPPED | OUTPATIENT
Start: 2022-07-05

## 2022-07-05 ASSESSMENT — FIBROSIS 4 INDEX: FIB4 SCORE: .7236842105263157895

## 2022-07-05 NOTE — PROGRESS NOTES
Chief Complaint   Patient presents with   • Cough     X 2-3 weeks having cough, chest congestion, nausea       HISTORY OF PRESENT ILLNESS: Patient is a pleasant 56 y.o. female who presents to urgent care today with complaints of a cough and GI symptoms. She has had a cough with congestion for the past two weeks. Cough has slightly improved. Notes onset of GI symptoms yesterday to include nausea, diarrhea, and feverish sensation. She has taken two COVID tests with negative results, last taken about a week ago. She has two co-workers who are ill with COVID.     Patient Active Problem List    Diagnosis Date Noted   • Lightheadedness 2021   • Class 2 obesity due to excess calories with body mass index (BMI) of 37.0 to 37.9 in adult 2020   • Paroxysmal A-fib ([facility]) 2016   • Recurrent major depressive disorder, in partial remission ([facility]) 2010   • Anxiety 2010       Allergies:Patient has no known allergies.    Current Outpatient Medications Ordered in Epic   Medication Sig Dispense Refill   • imiquimod (ALDARA) 5 % cream APPLY ONCE DAILY MONDAY THROUGH FRIDAY FOR 6 WEEKS       No current Epic-ordered facility-administered medications on file.       Past Medical History:   Diagnosis Date   • Acute chest pain 3/29/2016   • Anxiety 3/8/2010   • Depression 3/8/2010       Social History     Tobacco Use   • Smoking status: Never Smoker   • Smokeless tobacco: Never Used   Vaping Use   • Vaping Use: Never used   Substance Use Topics   • Alcohol use: No     Alcohol/week: 0.0 oz   • Drug use: No       Family Status   Relation Name Status   • Fa  Alive   • Mo  Alive   • MGMo     • MGFa     • PGMo          alzheimers   • PGFa     • PUnc       Family History   Problem Relation Age of Onset   • Psychiatric Illness Father         depression   • Heart Disease Mother         atrial fibrillation    • Heart Disease Maternal Grandmother         atrial fibrillation    •  "Other Paternal Uncle         Brain Tumor       ROS:  Review of Systems   Constitutional: Positive for fever, chills, weight loss, malaise, and fatigue.   HENT: Positive for congestion. Negative for ear pain, nosebleeds, sore throat and neck pain.    Eyes: Negative for vision changes.   Neuro: Negative for headache, sensory changes, weakness, seizure, LOC.   Cardiovascular: Negative for chest pain, palpitations, orthopnea and leg swelling.   Respiratory: Positive for cough. Negative for shortness of breath and wheezing.   Gastrointestinal: Positive for nausea, diarrhea. Negative for abdominal pain, vomiting.   Genitourinary: Negative for dysuria, urgency and frequency.  Musculoskeletal: Negative for falls, neck pain, back pain, joint pain, myalgias.   Skin: Negative for rash, diaphoresis.     Exam:  /76 (BP Location: Left arm, Patient Position: Sitting, BP Cuff Size: Large adult)   Pulse 63   Temp 36.8 °C (98.3 °F) (Temporal)   Resp 16   Ht 1.803 m (5' 11\")   Wt 121 kg (266 lb 12.8 oz)   SpO2 96%   General: well-nourished, well-developed female in NAD  Head: normocephalic, atraumatic  Eyes: PERRLA, no conjunctival injection, acuity grossly intact, lids normal.  Ears: normal shape and symmetry, no tenderness, no discharge. External canals are without any significant edema or erythema. Tympanic membranes are without any inflammation, no effusion. Gross auditory acuity is intact.  Nose: symmetrical without tenderness, no discharge.  Mouth/Throat: reasonable hygiene, no erythema, exudates or tonsillar enlargement.  Neck: no masses, range of motion within normal limits, no tracheal deviation. No obvious thyroid enlargement.   Lymph: no cervical adenopathy. No supraclavicular adenopathy.   Neuro: alert and oriented. Cranial nerves 1-12 grossly intact. No sensory deficit.   Cardiovascular: regular rate and rhythm. No edema.  Pulmonary: no distress. Chest is symmetrical with respiration, no wheezes, crackles, or " rhonchi.   Abdomen: soft, non-tender, no guarding, no hepatosplenomegaly.  Musculoskeletal: no clubbing, appropriate muscle tone, gait is stable.  Skin: warm, dry, intact, no clubbing, no cyanosis, no rashes.   Psych: appropriate mood, affect, judgement.       POC COVID negative      Assessment/Plan:  1. Viral illness     2. Cough  POCT SARS-COV Antigen ANGELIA (Symptomatic only)    benzonatate (TESSALON) 100 MG Cap       Patient presents with URI symptoms for the past 2 weeks, though symptoms have slightly improved although still lingering.  She is concerned with recent onset of GI symptoms the last 2 days.  Patient is well-appearing, no toxicity, no significant abdominal tenderness.  COVID-negative in clinic.  Suspect viral process.  Tessalon Perles as directed, increase hydration, diet as tolerated.  Supportive care, differential diagnoses, and indications for immediate follow-up discussed with patient.   Pathogenesis of diagnosis discussed including typical length and natural progression.   Instructed to return to clinic or nearest emergency department for any change in condition, further concerns, or worsening of symptoms.  Patient states understanding of the plan of care and discharge instructions.  Instructed to make an appointment, for follow up, with her primary care provider.        Please note that this dictation was created using voice recognition software. I have made every reasonable attempt to correct obvious errors, but I expect that there are errors of grammar and possibly content that I did not discover before finalizing the note. Patient seen and evaluated by both myself and APRN student Harper Zavala.         LEVY Faustin.

## 2022-07-05 NOTE — LETTER
July 5, 2022         Patient: Meli Navarrete   YOB: 1965   Date of Visit: 7/5/2022           To Whom it May Concern:    Meli Navarrete was seen in my clinic on 7/5/2022. She may be excused from work today.    If you have any questions or concerns, please don't hesitate to call.        Sincerely,           LUCIEN Faustin  Electronically Signed

## 2022-08-21 ENCOUNTER — APPOINTMENT (OUTPATIENT)
Dept: RADIOLOGY | Facility: MEDICAL CENTER | Age: 57
End: 2022-08-21
Attending: EMERGENCY MEDICINE

## 2022-08-21 ENCOUNTER — HOSPITAL ENCOUNTER (EMERGENCY)
Facility: MEDICAL CENTER | Age: 57
End: 2022-08-21
Attending: EMERGENCY MEDICINE

## 2022-08-21 VITALS
HEART RATE: 69 BPM | RESPIRATION RATE: 16 BRPM | SYSTOLIC BLOOD PRESSURE: 121 MMHG | HEIGHT: 71 IN | WEIGHT: 266 LBS | TEMPERATURE: 98.3 F | BODY MASS INDEX: 37.24 KG/M2 | DIASTOLIC BLOOD PRESSURE: 63 MMHG | OXYGEN SATURATION: 95 %

## 2022-08-21 DIAGNOSIS — N30.90 CYSTITIS: ICD-10-CM

## 2022-08-21 DIAGNOSIS — R10.30 LOWER ABDOMINAL PAIN: ICD-10-CM

## 2022-08-21 LAB
ALBUMIN SERPL BCP-MCNC: 4.2 G/DL (ref 3.2–4.9)
ALBUMIN/GLOB SERPL: 1.6 G/DL
ALP SERPL-CCNC: 46 U/L (ref 30–99)
ALT SERPL-CCNC: 25 U/L (ref 2–50)
ANION GAP SERPL CALC-SCNC: 10 MMOL/L (ref 7–16)
APPEARANCE UR: CLEAR
AST SERPL-CCNC: 20 U/L (ref 12–45)
BACTERIA #/AREA URNS HPF: ABNORMAL /HPF
BASOPHILS # BLD AUTO: 1 % (ref 0–1.8)
BASOPHILS # BLD: 0.06 K/UL (ref 0–0.12)
BILIRUB SERPL-MCNC: 0.4 MG/DL (ref 0.1–1.5)
BILIRUB UR QL STRIP.AUTO: NEGATIVE
BUN SERPL-MCNC: 18 MG/DL (ref 8–22)
CALCIUM SERPL-MCNC: 8.8 MG/DL (ref 8.5–10.5)
CHLORIDE SERPL-SCNC: 104 MMOL/L (ref 96–112)
CO2 SERPL-SCNC: 24 MMOL/L (ref 20–33)
COLOR UR: YELLOW
CREAT SERPL-MCNC: 0.92 MG/DL (ref 0.5–1.4)
EOSINOPHIL # BLD AUTO: 0.14 K/UL (ref 0–0.51)
EOSINOPHIL NFR BLD: 2.4 % (ref 0–6.9)
EPI CELLS #/AREA URNS HPF: ABNORMAL /HPF
ERYTHROCYTE [DISTWIDTH] IN BLOOD BY AUTOMATED COUNT: 43.4 FL (ref 35.9–50)
GFR SERPLBLD CREATININE-BSD FMLA CKD-EPI: 73 ML/MIN/1.73 M 2
GLOBULIN SER CALC-MCNC: 2.7 G/DL (ref 1.9–3.5)
GLUCOSE SERPL-MCNC: 114 MG/DL (ref 65–99)
GLUCOSE UR STRIP.AUTO-MCNC: NEGATIVE MG/DL
HCG SERPL QL: NEGATIVE
HCT VFR BLD AUTO: 44.3 % (ref 37–47)
HGB BLD-MCNC: 14.7 G/DL (ref 12–16)
HYALINE CASTS #/AREA URNS LPF: ABNORMAL /LPF
IMM GRANULOCYTES # BLD AUTO: 0.01 K/UL (ref 0–0.11)
IMM GRANULOCYTES NFR BLD AUTO: 0.2 % (ref 0–0.9)
KETONES UR STRIP.AUTO-MCNC: NEGATIVE MG/DL
LEUKOCYTE ESTERASE UR QL STRIP.AUTO: ABNORMAL
LIPASE SERPL-CCNC: 41 U/L (ref 11–82)
LYMPHOCYTES # BLD AUTO: 1.94 K/UL (ref 1–4.8)
LYMPHOCYTES NFR BLD: 33.7 % (ref 22–41)
MCH RBC QN AUTO: 29.8 PG (ref 27–33)
MCHC RBC AUTO-ENTMCNC: 33.2 G/DL (ref 33.6–35)
MCV RBC AUTO: 89.9 FL (ref 81.4–97.8)
MICRO URNS: ABNORMAL
MONOCYTES # BLD AUTO: 0.51 K/UL (ref 0–0.85)
MONOCYTES NFR BLD AUTO: 8.9 % (ref 0–13.4)
NEUTROPHILS # BLD AUTO: 3.1 K/UL (ref 2–7.15)
NEUTROPHILS NFR BLD: 53.8 % (ref 44–72)
NITRITE UR QL STRIP.AUTO: NEGATIVE
NRBC # BLD AUTO: 0 K/UL
NRBC BLD-RTO: 0 /100 WBC
PH UR STRIP.AUTO: 5.5 [PH] (ref 5–8)
PLATELET # BLD AUTO: 300 K/UL (ref 164–446)
PMV BLD AUTO: 9.2 FL (ref 9–12.9)
POTASSIUM SERPL-SCNC: 4.1 MMOL/L (ref 3.6–5.5)
PROT SERPL-MCNC: 6.9 G/DL (ref 6–8.2)
PROT UR QL STRIP: NEGATIVE MG/DL
RBC # BLD AUTO: 4.93 M/UL (ref 4.2–5.4)
RBC # URNS HPF: ABNORMAL /HPF
RBC UR QL AUTO: NEGATIVE
SODIUM SERPL-SCNC: 138 MMOL/L (ref 135–145)
SP GR UR STRIP.AUTO: 1.01
UROBILINOGEN UR STRIP.AUTO-MCNC: 0.2 MG/DL
WBC # BLD AUTO: 5.8 K/UL (ref 4.8–10.8)
WBC #/AREA URNS HPF: ABNORMAL /HPF

## 2022-08-21 PROCEDURE — 80053 COMPREHEN METABOLIC PANEL: CPT

## 2022-08-21 PROCEDURE — 84703 CHORIONIC GONADOTROPIN ASSAY: CPT

## 2022-08-21 PROCEDURE — 36415 COLL VENOUS BLD VENIPUNCTURE: CPT

## 2022-08-21 PROCEDURE — 81001 URINALYSIS AUTO W/SCOPE: CPT

## 2022-08-21 PROCEDURE — 99284 EMERGENCY DEPT VISIT MOD MDM: CPT

## 2022-08-21 PROCEDURE — 700102 HCHG RX REV CODE 250 W/ 637 OVERRIDE(OP): Performed by: EMERGENCY MEDICINE

## 2022-08-21 PROCEDURE — 85025 COMPLETE CBC W/AUTO DIFF WBC: CPT

## 2022-08-21 PROCEDURE — A9270 NON-COVERED ITEM OR SERVICE: HCPCS | Performed by: EMERGENCY MEDICINE

## 2022-08-21 PROCEDURE — 83690 ASSAY OF LIPASE: CPT

## 2022-08-21 PROCEDURE — 74176 CT ABD & PELVIS W/O CONTRAST: CPT

## 2022-08-21 RX ORDER — CEFDINIR 300 MG/1
300 CAPSULE ORAL 2 TIMES DAILY
Qty: 14 CAPSULE | Refills: 0 | Status: SHIPPED | OUTPATIENT
Start: 2022-08-21 | End: 2022-08-28

## 2022-08-21 RX ORDER — CEFDINIR 300 MG/1
300 CAPSULE ORAL ONCE
Status: COMPLETED | OUTPATIENT
Start: 2022-08-21 | End: 2022-08-21

## 2022-08-21 RX ADMIN — CEFDINIR 300 MG: 300 CAPSULE ORAL at 06:55

## 2022-08-21 ASSESSMENT — FIBROSIS 4 INDEX: FIB4 SCORE: .7236842105263157895

## 2022-08-21 NOTE — ED PROVIDER NOTES
"ED Provider Note    CHIEF COMPLAINT  Chief Complaint   Patient presents with    Abdominal Pain     X2 hours    Back Pain       HPI  Meli Navarrete is a 56 y.o. female who presents for evaluation of abdominal pain and back pain.  Patient notes the pain is been off and on for about a week and seems to originate in her left flank.  She notes that around 330 this morning it became so severe she was \"curled up in a ball\" and notes that the pain was in a band across her low abdomen.  Currently she has no pain she states the pain went away after she arrived here.  She notes no hematuria or dysuria and no constipation or significant diarrhea.  She notes nausea when the pain is severe but none currently.  She has had no fevers or chills.    REVIEW OF SYSTEMS  Constitutional: No fevers or chills  Skin: No rashes  HEENT: No sore throat, runny nose  Neck: No neck pain  Chest: No pain or rashes  Pulm: No shortness of breath, cough, wheezing, stridor, or pain with inspiration/expiration  Gastrointestinal: No nausea, vomiting, diarrhea, constipation, bloating, melena, or hematochezia   Genitourinary: No dysuria or hematuria  Musculoskeletal: No recent trauma, pain, swelling, weakness  Neurologic: No sensory or motor changes. No confusion or disorientation.  Heme: No bleeding or bruising problems.   Immuno: No hx of recurrent infections    PAST FAM HISTORY  Family History   Problem Relation Age of Onset    Psychiatric Illness Father         depression    Heart Disease Mother         atrial fibrillation     Heart Disease Maternal Grandmother         atrial fibrillation     Other Paternal Uncle         Brain Tumor       PAST MEDICAL HISTORY   has a past medical history of Acute chest pain (3/29/2016), Anxiety (3/8/2010), and Depression (3/8/2010).    SOCIAL HISTORY  Social History     Tobacco Use    Smoking status: Never    Smokeless tobacco: Never   Vaping Use    Vaping Use: Never used   Substance and Sexual Activity    " "Alcohol use: No     Alcohol/week: 0.0 oz    Drug use: No    Sexual activity: Yes     Partners: Male       SURGICAL HISTORY   has a past surgical history that includes gyn surgery (2001).    CURRENT MEDICATIONS  Home Medications       Reviewed by Sherita Castellon R.N. (Registered Nurse) on 08/21/22 at 0442  Med List Status: Not Addressed     Medication Last Dose Status   benzonatate (TESSALON) 100 MG Cap  Active   imiquimod (ALDARA) 5 % cream  Active                    ALLERGIES  No Known Allergies    PHYSICAL EXAM  VITAL SIGNS: /63   Pulse 69   Temp 36.8 °C (98.3 °F) (Temporal)   Resp 16   Ht 1.803 m (5' 11\")   Wt 121 kg (266 lb)   LMP 07/21/2022 (Approximate)   SpO2 95%   BMI 37.10 kg/m²    Gen: Alert in no apparent distress.  HEENT: No signs of trauma, Bilateral external ears normal, Nose normal. Conjunctiva normal, Non-icteric.   Cardiovascular: Regular rate and rhythm, no murmurs.  Capillary refill less than 3 seconds to all extremities, 2+ distal pulses.  Thorax & Lungs: Normal breath sounds, No respiratory distress, No wheezing bilateral chest rise  Abdomen: Bowel sounds normal, Soft, No tenderness, No masses, No pulsatile masses. No Guarding or rebound  Skin: Warm, Dry, No erythema, No rash noted to exposed areas.   Extremities: Intact distal pulses, No edema  Neurologic: Alert , no facial droop, grossly normal coordination and strength  Psychiatric: Affect pleasant      LABS  Results for orders placed or performed during the hospital encounter of 08/21/22   CBC WITH DIFFERENTIAL   Result Value Ref Range    WBC 5.8 4.8 - 10.8 K/uL    RBC 4.93 4.20 - 5.40 M/uL    Hemoglobin 14.7 12.0 - 16.0 g/dL    Hematocrit 44.3 37.0 - 47.0 %    MCV 89.9 81.4 - 97.8 fL    MCH 29.8 27.0 - 33.0 pg    MCHC 33.2 (L) 33.6 - 35.0 g/dL    RDW 43.4 35.9 - 50.0 fL    Platelet Count 300 164 - 446 K/uL    MPV 9.2 9.0 - 12.9 fL    Neutrophils-Polys 53.80 44.00 - 72.00 %    Lymphocytes 33.70 22.00 - 41.00 %    Monocytes " 8.90 0.00 - 13.40 %    Eosinophils 2.40 0.00 - 6.90 %    Basophils 1.00 0.00 - 1.80 %    Immature Granulocytes 0.20 0.00 - 0.90 %    Nucleated RBC 0.00 /100 WBC    Neutrophils (Absolute) 3.10 2.00 - 7.15 K/uL    Lymphs (Absolute) 1.94 1.00 - 4.80 K/uL    Monos (Absolute) 0.51 0.00 - 0.85 K/uL    Eos (Absolute) 0.14 0.00 - 0.51 K/uL    Baso (Absolute) 0.06 0.00 - 0.12 K/uL    Immature Granulocytes (abs) 0.01 0.00 - 0.11 K/uL    NRBC (Absolute) 0.00 K/uL   COMP METABOLIC PANEL   Result Value Ref Range    Sodium 138 135 - 145 mmol/L    Potassium 4.1 3.6 - 5.5 mmol/L    Chloride 104 96 - 112 mmol/L    Co2 24 20 - 33 mmol/L    Anion Gap 10.0 7.0 - 16.0    Glucose 114 (H) 65 - 99 mg/dL    Bun 18 8 - 22 mg/dL    Creatinine 0.92 0.50 - 1.40 mg/dL    Calcium 8.8 8.5 - 10.5 mg/dL    AST(SGOT) 20 12 - 45 U/L    ALT(SGPT) 25 2 - 50 U/L    Alkaline Phosphatase 46 30 - 99 U/L    Total Bilirubin 0.4 0.1 - 1.5 mg/dL    Albumin 4.2 3.2 - 4.9 g/dL    Total Protein 6.9 6.0 - 8.2 g/dL    Globulin 2.7 1.9 - 3.5 g/dL    A-G Ratio 1.6 g/dL   LIPASE   Result Value Ref Range    Lipase 41 11 - 82 U/L   HCG QUAL SERUM   Result Value Ref Range    Beta-Hcg Qualitative Serum Negative Negative   URINALYSIS,CULTURE IF INDICATED    Specimen: Urine   Result Value Ref Range    Color Yellow     Character Clear     Specific Gravity 1.014 <1.035    Ph 5.5 5.0 - 8.0    Glucose Negative Negative mg/dL    Ketones Negative Negative mg/dL    Protein Negative Negative mg/dL    Bilirubin Negative Negative    Urobilinogen, Urine 0.2 Negative    Nitrite Negative Negative    Leukocyte Esterase Small (A) Negative    Occult Blood Negative Negative    Micro Urine Req Microscopic    ESTIMATED GFR   Result Value Ref Range    GFR (CKD-EPI) 73 >60 mL/min/1.73 m 2   URINE MICROSCOPIC (W/UA)   Result Value Ref Range    WBC 5-10 (A) /hpf    RBC 0-2 /hpf    Bacteria Few (A) None /hpf    Epithelial Cells Few /hpf    Hyaline Cast 0-2 /lpf       RADIOLOGY  CT-RENAL COLIC  EVALUATION(A/P W/O)   Final Result      1.  No evidence of nephroureterolithiasis or obstructive uropathy.   2.  Hepatomegaly.   3.  Borderline splenomegaly.   4.  Colonic diverticulosis without discrete evidence of diverticulitis.   5.  Enlarged lobular appearing uterus. Findings could be seen in setting of uterine fibroid(s). Pelvic ultrasound could be obtained for further evaluation if indicated.   6.  Small focus of gas present anteriorly in the urinary bladder. Correlate for history of recent instrumentation.            COURSE & MEDICAL DECISION MAKING  Patient arrives for evaluation of symptoms which have completely resolved at the time of my evaluation arguing for transient source of nonemergent pathology such as ureteral stones.  Patient actually has no history of this however, based on her evaluation, I feel it is reasonable to perform a renal protocol CT and laboratory evaluation.  Patient notes no recent fevers, chills, constipation, hematuria, or dysuria.    Patient's labs are returning reassuring although it is notable she has an abnormally high amount of white blood cells, bacteria, and a small amount of leukocyte esterase in her urine.  She also has a small apparent air bubble in her bladder which could be related to a mild infectious process.  She has incidental findings such as hepatomegaly and splenomegaly and colonic diverticulosis.  There is no discrete evidence of diverticulitis and I feel a colovesicular fistula is unlikely given these findings.  She also has an enlarged globular appearing uterus but no recent vaginal bleeding or discharge.  I feel all of these incidental findings can be followed up as an outpatient but treatment for possible urinary tract infection as a source of her symptomology is reasonable.  She could be experiencing bladder spasms or ureteral spasms from this.  She states understanding that the diagnosis is presumed and that if her symptoms worsen or change she would need  to return for reevaluation.  Patient is currently comfortable appearing and nontoxic.  She states understanding of the treatment regimen and will follow up with her primary care physician otherwise.  FINAL IMPRESSION  1. Lower abdominal pain    2. Cystitis        Electronically signed by: Chapin Sanders M.D., 8/21/2022 5:07 AM

## 2022-08-21 NOTE — ED TRIAGE NOTES
"Chief Complaint   Patient presents with    Abdominal Pain     X2 hours    Back Pain     Pt complaining of abdominal/pelvic/back pain that's been ongoing for the last week but progressively worse over the last hour. Pt states pain started on her left side and now is the middle of her lower abdomen. Protocol ordered.     Pt is alert and oriented, speaking in full sentences, follows commands and responds appropriately to questions.      Pt placed in lobby. Pt educated on triage process and apologized for wait time. Pt encouraged to alert staff for any changes.     Patient and staff wearing appropriate PPE      /84   Pulse 77   Temp 36.1 °C (97 °F) (Temporal)   Resp 18   Ht 1.803 m (5' 11\")   Wt 121 kg (266 lb)   SpO2 94%       "